# Patient Record
Sex: FEMALE | URBAN - METROPOLITAN AREA
[De-identification: names, ages, dates, MRNs, and addresses within clinical notes are randomized per-mention and may not be internally consistent; named-entity substitution may affect disease eponyms.]

---

## 2024-05-23 ENCOUNTER — APPOINTMENT (RX ONLY)
Dept: URBAN - METROPOLITAN AREA CLINIC 356 | Facility: CLINIC | Age: 49
Setting detail: DERMATOLOGY
End: 2024-05-23

## 2024-05-23 DIAGNOSIS — D22 MELANOCYTIC NEVI: ICD-10-CM

## 2024-05-23 DIAGNOSIS — L57.0 ACTINIC KERATOSIS: ICD-10-CM

## 2024-05-23 DIAGNOSIS — L81.4 OTHER MELANIN HYPERPIGMENTATION: ICD-10-CM

## 2024-05-23 DIAGNOSIS — L72.11 PILAR CYST: ICD-10-CM

## 2024-05-23 DIAGNOSIS — L82.1 OTHER SEBORRHEIC KERATOSIS: ICD-10-CM

## 2024-05-23 DIAGNOSIS — D18.0 HEMANGIOMA: ICD-10-CM

## 2024-05-23 PROBLEM — D18.01 HEMANGIOMA OF SKIN AND SUBCUTANEOUS TISSUE: Status: ACTIVE | Noted: 2024-05-23

## 2024-05-23 PROBLEM — D22.5 MELANOCYTIC NEVI OF TRUNK: Status: ACTIVE | Noted: 2024-05-23

## 2024-05-23 PROCEDURE — ? COUNSELING

## 2024-05-23 PROCEDURE — ? FULL BODY SKIN EXAM

## 2024-05-23 PROCEDURE — ? TREATMENT REGIMEN

## 2024-05-23 PROCEDURE — 17003 DESTRUCT PREMALG LES 2-14: CPT

## 2024-05-23 PROCEDURE — ? OBSERVATION AND MEASURE

## 2024-05-23 PROCEDURE — 99203 OFFICE O/P NEW LOW 30 MIN: CPT | Mod: 25

## 2024-05-23 PROCEDURE — 17000 DESTRUCT PREMALG LESION: CPT

## 2024-05-23 PROCEDURE — ? LIQUID NITROGEN

## 2024-05-23 ASSESSMENT — LOCATION SIMPLE DESCRIPTION DERM
LOCATION SIMPLE: RIGHT LOWER BACK
LOCATION SIMPLE: RIGHT FOREHEAD
LOCATION SIMPLE: LEFT THIGH
LOCATION SIMPLE: CHEST
LOCATION SIMPLE: ABDOMEN
LOCATION SIMPLE: UPPER BACK

## 2024-05-23 ASSESSMENT — LOCATION DETAILED DESCRIPTION DERM
LOCATION DETAILED: LEFT ANTERIOR DISTAL THIGH
LOCATION DETAILED: RIGHT SUPERIOR MEDIAL FOREHEAD
LOCATION DETAILED: SUPERIOR THORACIC SPINE
LOCATION DETAILED: UPPER STERNUM
LOCATION DETAILED: RIGHT SUPERIOR FOREHEAD
LOCATION DETAILED: RIGHT SUPERIOR MEDIAL MIDBACK
LOCATION DETAILED: EPIGASTRIC SKIN

## 2024-05-23 ASSESSMENT — LOCATION ZONE DERM
LOCATION ZONE: LEG
LOCATION ZONE: TRUNK
LOCATION ZONE: FACE

## 2024-05-23 NOTE — PROCEDURE: OBSERVATION
Detail Level: Detailed
Size Of Lesion: 2cm
Instructions (Optional): Codes given for pt to check cost with insurance